# Patient Record
Sex: MALE | Race: WHITE | URBAN - METROPOLITAN AREA
[De-identification: names, ages, dates, MRNs, and addresses within clinical notes are randomized per-mention and may not be internally consistent; named-entity substitution may affect disease eponyms.]

---

## 2017-08-08 ENCOUNTER — APPOINTMENT (RX ONLY)
Dept: URBAN - METROPOLITAN AREA OTHER 12 | Facility: OTHER | Age: 27
Setting detail: DERMATOLOGY
End: 2017-08-08

## 2017-08-08 DIAGNOSIS — Z79.899 OTHER LONG TERM (CURRENT) DRUG THERAPY: ICD-10-CM

## 2017-08-08 DIAGNOSIS — L70.0 ACNE VULGARIS: ICD-10-CM

## 2017-08-08 PROCEDURE — ? ORDER TESTS

## 2017-08-08 PROCEDURE — 99213 OFFICE O/P EST LOW 20 MIN: CPT

## 2017-08-08 PROCEDURE — ? ISOTRETINOIN INITIATION

## 2017-08-08 PROCEDURE — ? COUNSELING

## 2017-08-08 PROCEDURE — ? PRESCRIPTION

## 2017-08-08 RX ORDER — ISOTRETINOIN 40 MG/1
CAPSULE, LIQUID FILLED ORAL
Qty: 30 | Refills: 0

## 2017-08-08 ASSESSMENT — LOCATION ZONE DERM
LOCATION ZONE: FACE
LOCATION ZONE: TRUNK

## 2017-08-08 ASSESSMENT — LOCATION SIMPLE DESCRIPTION DERM
LOCATION SIMPLE: CHEST
LOCATION SIMPLE: RIGHT CHEEK
LOCATION SIMPLE: LEFT UPPER BACK

## 2017-08-08 ASSESSMENT — LOCATION DETAILED DESCRIPTION DERM
LOCATION DETAILED: RIGHT CENTRAL MALAR CHEEK
LOCATION DETAILED: LEFT SUPERIOR UPPER BACK
LOCATION DETAILED: RIGHT MEDIAL SUPERIOR CHEST

## 2017-08-08 NOTE — HPI: PIMPLES (ACNE)
How Severe Is Your Acne?: severe
Is This A New Presentation, Or A Follow-Up?: Follow Up Acne
Additional Comments (Use Complete Sentences): Patient reports he has current acne cystic flares off and on to his face and back.  He has noticed new scarring.  He has been using OTC BP cream as a spot treatment for the last 4 months.  Patient is here to start Accutane.

## 2017-08-08 NOTE — PROCEDURE: ISOTRETINOIN INITIATION
Initial Cholesterol (Optional): Standing Lab order: Liver / Lipid panel : Pending results
Ipledge Number (Optional): [id number]
Detail Level: Zone
Patient Reported Weight (Optional - Include Units): 165

## 2017-09-11 ENCOUNTER — APPOINTMENT (RX ONLY)
Dept: URBAN - METROPOLITAN AREA OTHER 12 | Facility: OTHER | Age: 27
Setting detail: DERMATOLOGY
End: 2017-09-11

## 2017-09-11 DIAGNOSIS — L70.0 ACNE VULGARIS: ICD-10-CM

## 2017-09-11 DIAGNOSIS — Z79.899 OTHER LONG TERM (CURRENT) DRUG THERAPY: ICD-10-CM

## 2017-09-11 PROCEDURE — ? PRESCRIPTION

## 2017-09-11 PROCEDURE — ? COUNSELING

## 2017-09-11 PROCEDURE — 99213 OFFICE O/P EST LOW 20 MIN: CPT

## 2017-09-11 PROCEDURE — ? ORDER TESTS

## 2017-09-11 PROCEDURE — ? ISOTRETINOIN MONITORING

## 2017-09-11 RX ORDER — ISOTRETINOIN 40 MG/1
CAPSULE ORAL
Qty: 60 | Refills: 0 | COMMUNITY
Start: 2017-09-11

## 2017-09-11 RX ADMIN — ISOTRETINOIN: 40 CAPSULE ORAL at 16:59

## 2017-09-11 ASSESSMENT — LOCATION DETAILED DESCRIPTION DERM
LOCATION DETAILED: LEFT CENTRAL MALAR CHEEK
LOCATION DETAILED: RIGHT MEDIAL SUPERIOR CHEST
LOCATION DETAILED: LEFT SUPERIOR UPPER BACK

## 2017-09-11 ASSESSMENT — LOCATION ZONE DERM
LOCATION ZONE: TRUNK
LOCATION ZONE: FACE

## 2017-09-11 ASSESSMENT — LOCATION SIMPLE DESCRIPTION DERM
LOCATION SIMPLE: LEFT CHEEK
LOCATION SIMPLE: CHEST
LOCATION SIMPLE: LEFT UPPER BACK

## 2017-09-11 NOTE — PROCEDURE: ISOTRETINOIN MONITORING
Dosing Month 1 (Required For Cumulative Dosing): 40mg Daily
Pounds Preamble Statement (Weight Entered In Details Tab): Reported Weight in pounds:
Display Individual Monthly Dosage In The Note (If Yes Will Display All Dosages Which Are Not N/A): yes
Female Completion Statement: After discussing her treatment course we decided to discontinue isotretinoin therapy at this time. I explained that she would need to continue her birth control methods for at least one month after the last dosage. She should also get a pregnancy test one month after the last dose. She shouldn't donate blood for one month after the last dose. She should call with any new symptoms of depression.
Male Completion Statement: After discussing his treatment course we decided to discontinue isotretinoin therapy at this time. He shouldn't donate blood for one month after the last dose. He should call with any new symptoms of depression.
Months Of Therapy Completed: 1
Kilograms Preamble Statement (Weight Entered In Details Tab): Reported Weight in kilograms:
Patient Weight (Optional But Required For Cumulative Dose-Numbers And Decimals Only): 165
Detail Level: Zone
Weight Units: pounds
Completed Therapy?: No
Ipledge Number (Optional): [id number]

## 2017-10-09 ENCOUNTER — APPOINTMENT (RX ONLY)
Dept: URBAN - METROPOLITAN AREA OTHER 12 | Facility: OTHER | Age: 27
Setting detail: DERMATOLOGY
End: 2017-10-09

## 2017-10-09 DIAGNOSIS — L70.0 ACNE VULGARIS: ICD-10-CM

## 2017-10-09 DIAGNOSIS — Z79.899 OTHER LONG TERM (CURRENT) DRUG THERAPY: ICD-10-CM

## 2017-10-09 PROCEDURE — ? ISOTRETINOIN MONITORING

## 2017-10-09 PROCEDURE — ? COUNSELING

## 2017-10-09 PROCEDURE — ? PRESCRIPTION

## 2017-10-09 PROCEDURE — ? TREATMENT REGIMEN

## 2017-10-09 PROCEDURE — 99213 OFFICE O/P EST LOW 20 MIN: CPT

## 2017-10-09 RX ORDER — ISOTRETINOIN 40 MG/1
CAPSULE ORAL
Qty: 60 | Refills: 0

## 2017-10-09 ASSESSMENT — LOCATION DETAILED DESCRIPTION DERM
LOCATION DETAILED: LEFT SUPERIOR MEDIAL UPPER BACK
LOCATION DETAILED: LEFT CENTRAL MALAR CHEEK

## 2017-10-09 ASSESSMENT — LOCATION ZONE DERM
LOCATION ZONE: TRUNK
LOCATION ZONE: FACE

## 2017-10-09 ASSESSMENT — LOCATION SIMPLE DESCRIPTION DERM
LOCATION SIMPLE: LEFT UPPER BACK
LOCATION SIMPLE: LEFT CHEEK

## 2017-10-09 NOTE — PROCEDURE: ISOTRETINOIN MONITORING
Kilograms Preamble Statement (Weight Entered In Details Tab): Reported Weight in kilograms:
Display Individual Monthly Dosage In The Note (If Yes Will Display All Dosages Which Are Not N/A): yes
Male Completion Statement: After discussing his treatment course we decided to discontinue isotretinoin therapy at this time. He shouldn't donate blood for one month after the last dose. He should call with any new symptoms of depression.
Dosing Month 1 (Required For Cumulative Dosing): 40mg Daily
Completed Therapy?: No
Dosing Month 2 (Required For Cumulative Dosing): 80mg Daily
Female Completion Statement: After discussing her treatment course we decided to discontinue isotretinoin therapy at this time. I explained that she would need to continue her birth control methods for at least one month after the last dosage. She should also get a pregnancy test one month after the last dose. She shouldn't donate blood for one month after the last dose. She should call with any new symptoms of depression.
Months Of Therapy Completed: 2
Detail Level: Zone
Patient Weight (Optional But Required For Cumulative Dose-Numbers And Decimals Only): 165
Weight Units: pounds
Pounds Preamble Statement (Weight Entered In Details Tab): Reported Weight in pounds:

## 2017-11-08 ENCOUNTER — APPOINTMENT (RX ONLY)
Dept: URBAN - METROPOLITAN AREA OTHER 12 | Facility: OTHER | Age: 27
Setting detail: DERMATOLOGY
End: 2017-11-08

## 2017-11-08 DIAGNOSIS — Z79.899 OTHER LONG TERM (CURRENT) DRUG THERAPY: ICD-10-CM

## 2017-11-08 DIAGNOSIS — L70.0 ACNE VULGARIS: ICD-10-CM | Status: IMPROVED

## 2017-11-08 PROCEDURE — ? ISOTRETINOIN MONITORING

## 2017-11-08 PROCEDURE — ? COUNSELING

## 2017-11-08 PROCEDURE — 99213 OFFICE O/P EST LOW 20 MIN: CPT

## 2017-11-08 PROCEDURE — ? PRESCRIPTION

## 2017-11-08 RX ORDER — ISOTRETINOIN 40 MG/1
CAPSULE ORAL
Qty: 90 | Refills: 0

## 2017-11-08 ASSESSMENT — LOCATION SIMPLE DESCRIPTION DERM: LOCATION SIMPLE: LEFT CHEEK

## 2017-11-08 ASSESSMENT — LOCATION DETAILED DESCRIPTION DERM: LOCATION DETAILED: LEFT CENTRAL MALAR CHEEK

## 2017-11-08 ASSESSMENT — LOCATION ZONE DERM: LOCATION ZONE: FACE

## 2017-11-08 NOTE — HPI: MEDICATION (ISOTRETINOIN)
How Severe Is It?: moderate
Is This A New Presentation, Or A Follow-Up?: Follow Up Isotretinoin
Display Cumulative Dose In The Note?: Yes
Patient Reported Weight In Pounds (Required For Calculation): 175

## 2017-11-08 NOTE — PROCEDURE: ISOTRETINOIN MONITORING
Detail Level: Zone
Patient Weight (Optional But Required For Cumulative Dose-Numbers And Decimals Only): 175
Pounds Preamble Statement (Weight Entered In Details Tab): Reported Weight in pounds:
Completed Therapy?: No
Ipledge Number (Optional): [id number]
Female Completion Statement: After discussing her treatment course we decided to discontinue isotretinoin therapy at this time. I explained that she would need to continue her birth control methods for at least one month after the last dosage. She should also get a pregnancy test one month after the last dose. She shouldn't donate blood for one month after the last dose. She should call with any new symptoms of depression.
Weight Units: pounds
Dosing Month 1 (Required For Cumulative Dosing): 40mg Daily
Months Of Therapy Completed: 4
Kilograms Preamble Statement (Weight Entered In Details Tab): Reported Weight in kilograms:
Male Completion Statement: After discussing his treatment course we decided to discontinue isotretinoin therapy at this time. He shouldn't donate blood for one month after the last dose. He should call with any new symptoms of depression.
Dosing Month 2 (Required For Cumulative Dosing): 40mg BID
Most Recent Triglycerides (Optional): 55
Are Labs Available For Review?: Yes
Most Recent Cholesterol (Optional): 191

## 2017-12-07 ENCOUNTER — APPOINTMENT (RX ONLY)
Dept: URBAN - METROPOLITAN AREA OTHER 12 | Facility: OTHER | Age: 27
Setting detail: DERMATOLOGY
End: 2017-12-07

## 2017-12-07 DIAGNOSIS — L70.0 ACNE VULGARIS: ICD-10-CM

## 2017-12-07 DIAGNOSIS — Z79.899 OTHER LONG TERM (CURRENT) DRUG THERAPY: ICD-10-CM

## 2017-12-07 PROCEDURE — 99213 OFFICE O/P EST LOW 20 MIN: CPT

## 2017-12-07 PROCEDURE — ? ISOTRETINOIN MONITORING

## 2017-12-07 PROCEDURE — ? PRESCRIPTION

## 2017-12-07 PROCEDURE — ? COUNSELING

## 2017-12-07 PROCEDURE — ? TREATMENT REGIMEN

## 2017-12-07 RX ORDER — ISOTRETINOIN 40 MG/1
CAPSULE ORAL
Qty: 90 | Refills: 0

## 2017-12-07 ASSESSMENT — LOCATION DETAILED DESCRIPTION DERM
LOCATION DETAILED: LEFT CENTRAL MALAR CHEEK
LOCATION DETAILED: RIGHT SUPERIOR UPPER BACK

## 2017-12-07 ASSESSMENT — LOCATION SIMPLE DESCRIPTION DERM
LOCATION SIMPLE: RIGHT UPPER BACK
LOCATION SIMPLE: LEFT CHEEK

## 2017-12-07 ASSESSMENT — LOCATION ZONE DERM
LOCATION ZONE: TRUNK
LOCATION ZONE: FACE

## 2017-12-07 NOTE — HPI: MEDICATION (ISOTRETINOIN)
How Severe Is It?: moderate
Is This A New Presentation, Or A Follow-Up?: Follow Up Acne
Additional History: The patient stated he had a couple of breakouts during the month of therapy.

## 2017-12-07 NOTE — PROCEDURE: ISOTRETINOIN MONITORING
Dosing Month 4 (Required For Cumulative Dosing): 120mg Daily
Weight Units: pounds
Are Labs Available For Review?: No- Not Drawn Yet
Completed Therapy?: No
Female Completion Statement: After discussing her treatment course we decided to discontinue isotretinoin therapy at this time. I explained that she would need to continue her birth control methods for at least one month after the last dosage. She should also get a pregnancy test one month after the last dose. She shouldn't donate blood for one month after the last dose. She should call with any new symptoms of depression.
Male Completion Statement: After discussing his treatment course we decided to discontinue isotretinoin therapy at this time. He shouldn't donate blood for one month after the last dose. He should call with any new symptoms of depression.
Dosing Month 3 (Required For Cumulative Dosing): 80mg Daily
Months Of Therapy Completed: 4
Patient Weight (Optional But Required For Cumulative Dose-Numbers And Decimals Only): 175
Pounds Preamble Statement (Weight Entered In Details Tab): Reported Weight in pounds:
Kilograms Preamble Statement (Weight Entered In Details Tab): Reported Weight in kilograms:
Detail Level: Zone
Display Individual Monthly Dosage In The Note (If Yes Will Display All Dosages Which Are Not N/A): yes
Dosing Month 1 (Required For Cumulative Dosing): 40mg Daily

## 2018-01-09 ENCOUNTER — APPOINTMENT (RX ONLY)
Dept: URBAN - METROPOLITAN AREA OTHER 12 | Facility: OTHER | Age: 28
Setting detail: DERMATOLOGY
End: 2018-01-09

## 2018-01-09 DIAGNOSIS — L70.0 ACNE VULGARIS: ICD-10-CM

## 2018-01-09 DIAGNOSIS — Z79.899 OTHER LONG TERM (CURRENT) DRUG THERAPY: ICD-10-CM

## 2018-01-09 PROCEDURE — 99213 OFFICE O/P EST LOW 20 MIN: CPT

## 2018-01-09 PROCEDURE — ? COUNSELING

## 2018-01-09 PROCEDURE — ? ISOTRETINOIN MONITORING

## 2018-01-09 PROCEDURE — ? PRESCRIPTION

## 2018-01-09 RX ORDER — ISOTRETINOIN 40 MG/1
CAPSULE ORAL
Qty: 90 | Refills: 0

## 2018-01-09 ASSESSMENT — LOCATION SIMPLE DESCRIPTION DERM
LOCATION SIMPLE: LEFT CHEEK
LOCATION SIMPLE: CHEST
LOCATION SIMPLE: RIGHT UPPER BACK

## 2018-01-09 ASSESSMENT — LOCATION DETAILED DESCRIPTION DERM
LOCATION DETAILED: LEFT INFERIOR CENTRAL MALAR CHEEK
LOCATION DETAILED: RIGHT MID-UPPER BACK
LOCATION DETAILED: LEFT MEDIAL SUPERIOR CHEST

## 2018-01-09 ASSESSMENT — LOCATION ZONE DERM
LOCATION ZONE: FACE
LOCATION ZONE: TRUNK

## 2018-01-09 NOTE — HPI: MEDICATION (ISOTRETINOIN)
How Severe Is It?: mild
Is This A New Presentation, Or A Follow-Up?: Follow Up Acne
Additional History: The patient stated he had some dryness around his genitalia. He has been using Vaseline on the area. He also experienced a shingles breakout 2 weeks ago. [facility] Acyclovir 800 mg five times a day for a week.

## 2018-01-09 NOTE — PROCEDURE: ISOTRETINOIN MONITORING
Display Individual Monthly Dosage In The Note (If Yes Will Display All Dosages Which Are Not N/A): yes
Female Completion Statement: After discussing her treatment course we decided to discontinue isotretinoin therapy at this time. I explained that she would need to continue her birth control methods for at least one month after the last dosage. She should also get a pregnancy test one month after the last dose. She shouldn't donate blood for one month after the last dose. She should call with any new symptoms of depression.
Are Labs Available For Review?: No- Not Drawn Yet
Kilograms Preamble Statement (Weight Entered In Details Tab): Reported Weight in kilograms:
Patient Weight (Optional But Required For Cumulative Dose-Numbers And Decimals Only): 175
Male Completion Statement: After discussing his treatment course we decided to discontinue isotretinoin therapy at this time. He shouldn't donate blood for one month after the last dose. He should call with any new symptoms of depression.
Dosing Month 4 (Required For Cumulative Dosing): 120mg Daily
Ipledge Number (Optional): [id number]
Months Of Therapy Completed: 5
Pounds Preamble Statement (Weight Entered In Details Tab): Reported Weight in pounds:
Completed Therapy?: No
Weight Units: pounds
Dosing Month 2 (Required For Cumulative Dosing): 80mg Daily
Detail Level: Zone
Dosing Month 1 (Required For Cumulative Dosing): 40mg Daily

## 2025-07-28 ENCOUNTER — APPOINTMENT (OUTPATIENT)
Dept: RADIOLOGY | Facility: CLINIC | Age: 35
End: 2025-07-28
Attending: EMERGENCY MEDICINE
Payer: COMMERCIAL

## 2025-07-28 ENCOUNTER — HOSPITAL ENCOUNTER (EMERGENCY)
Facility: CLINIC | Age: 35
Discharge: HOME | End: 2025-07-28
Attending: EMERGENCY MEDICINE | Admitting: EMERGENCY MEDICINE
Payer: COMMERCIAL

## 2025-07-28 VITALS
BODY MASS INDEX: 28.73 KG/M2 | OXYGEN SATURATION: 97 % | HEART RATE: 70 BPM | RESPIRATION RATE: 21 BRPM | HEIGHT: 69 IN | WEIGHT: 194 LBS | SYSTOLIC BLOOD PRESSURE: 137 MMHG | TEMPERATURE: 98.6 F | DIASTOLIC BLOOD PRESSURE: 74 MMHG

## 2025-07-28 DIAGNOSIS — R07.89 ATYPICAL CHEST PAIN: ICD-10-CM

## 2025-07-28 DIAGNOSIS — R06.02 SHORTNESS OF BREATH: Primary | ICD-10-CM

## 2025-07-28 LAB
ANION GAP SERPL CALCULATED.3IONS-SCNC: 11 MMOL/L (ref 7–15)
BASOPHILS # BLD AUTO: 0 10E3/UL (ref 0–0.2)
BASOPHILS NFR BLD AUTO: 1 %
BUN SERPL-MCNC: 14.7 MG/DL (ref 6–20)
CALCIUM SERPL-MCNC: 9.8 MG/DL (ref 8.8–10.4)
CHLORIDE SERPL-SCNC: 102 MMOL/L (ref 98–107)
CREAT SERPL-MCNC: 1.31 MG/DL (ref 0.67–1.17)
EGFRCR SERPLBLD CKD-EPI 2021: 73 ML/MIN/1.73M2
EOSINOPHIL # BLD AUTO: 0.1 10E3/UL (ref 0–0.7)
EOSINOPHIL NFR BLD AUTO: 2 %
ERYTHROCYTE [DISTWIDTH] IN BLOOD BY AUTOMATED COUNT: 13.6 % (ref 10–15)
GLUCOSE SERPL-MCNC: 115 MG/DL (ref 70–99)
HCO3 SERPL-SCNC: 24 MMOL/L (ref 22–29)
HCT VFR BLD AUTO: 41.1 % (ref 40–53)
HGB BLD-MCNC: 14.2 G/DL (ref 13.3–17.7)
IMM GRANULOCYTES # BLD: 0.1 10E3/UL
IMM GRANULOCYTES NFR BLD: 1 %
LYMPHOCYTES # BLD AUTO: 2 10E3/UL (ref 0.8–5.3)
LYMPHOCYTES NFR BLD AUTO: 35 %
MCH RBC QN AUTO: 32.1 PG (ref 26.5–33)
MCHC RBC AUTO-ENTMCNC: 34.5 G/DL (ref 31.5–36.5)
MCV RBC AUTO: 93 FL (ref 78–100)
MONOCYTES # BLD AUTO: 0.5 10E3/UL (ref 0–1.3)
MONOCYTES NFR BLD AUTO: 8 %
NEUTROPHILS # BLD AUTO: 3.2 10E3/UL (ref 1.6–8.3)
NEUTROPHILS NFR BLD AUTO: 55 %
NRBC # BLD AUTO: 0 10E3/UL
NRBC BLD AUTO-RTO: 0 /100
PLATELET # BLD AUTO: 218 10E3/UL (ref 150–450)
POTASSIUM SERPL-SCNC: 4.1 MMOL/L (ref 3.4–5.3)
RBC # BLD AUTO: 4.42 10E6/UL (ref 4.4–5.9)
SODIUM SERPL-SCNC: 137 MMOL/L (ref 135–145)
TROPONIN T SERPL HS-MCNC: <6 NG/L
WBC # BLD AUTO: 5.9 10E3/UL (ref 4–11)

## 2025-07-28 PROCEDURE — 36415 COLL VENOUS BLD VENIPUNCTURE: CPT | Performed by: EMERGENCY MEDICINE

## 2025-07-28 PROCEDURE — 82374 ASSAY BLOOD CARBON DIOXIDE: CPT | Performed by: EMERGENCY MEDICINE

## 2025-07-28 PROCEDURE — 84484 ASSAY OF TROPONIN QUANT: CPT | Performed by: EMERGENCY MEDICINE

## 2025-07-28 PROCEDURE — 70360 X-RAY EXAM OF NECK: CPT

## 2025-07-28 PROCEDURE — 250N000013 HC RX MED GY IP 250 OP 250 PS 637: Performed by: EMERGENCY MEDICINE

## 2025-07-28 PROCEDURE — 71046 X-RAY EXAM CHEST 2 VIEWS: CPT

## 2025-07-28 PROCEDURE — 250N000011 HC RX IP 250 OP 636: Performed by: EMERGENCY MEDICINE

## 2025-07-28 PROCEDURE — 93005 ELECTROCARDIOGRAM TRACING: CPT | Performed by: EMERGENCY MEDICINE

## 2025-07-28 PROCEDURE — 99285 EMERGENCY DEPT VISIT HI MDM: CPT | Mod: 25 | Performed by: EMERGENCY MEDICINE

## 2025-07-28 PROCEDURE — 85014 HEMATOCRIT: CPT | Performed by: EMERGENCY MEDICINE

## 2025-07-28 RX ORDER — ASPIRIN 81 MG/1
324 TABLET, CHEWABLE ORAL ONCE
Status: COMPLETED | OUTPATIENT
Start: 2025-07-28 | End: 2025-07-28

## 2025-07-28 RX ADMIN — ASPIRIN 81 MG CHEWABLE TABLET 324 MG: 81 TABLET CHEWABLE at 20:31

## 2025-07-28 RX ADMIN — FAMOTIDINE 20 MG: 10 INJECTION INTRAVENOUS at 20:31

## 2025-07-28 ASSESSMENT — ACTIVITIES OF DAILY LIVING (ADL)
ADLS_ACUITY_SCORE: 41
ADLS_ACUITY_SCORE: 41

## 2025-07-29 LAB
ATRIAL RATE - MUSE: 92 BPM
DIASTOLIC BLOOD PRESSURE - MUSE: 79 MMHG
INTERPRETATION ECG - MUSE: NORMAL
P AXIS - MUSE: 73 DEGREES
PR INTERVAL - MUSE: 154 MS
QRS DURATION - MUSE: 90 MS
QT - MUSE: 378 MS
QTC - MUSE: 467 MS
R AXIS - MUSE: 48 DEGREES
SYSTOLIC BLOOD PRESSURE - MUSE: 174 MMHG
T AXIS - MUSE: 35 DEGREES
VENTRICULAR RATE- MUSE: 92 BPM

## 2025-07-29 NOTE — ED PROVIDER NOTES
EMERGENCY DEPARTMENT ENCOUNTER      NAME: Ulysses C Cortez  AGE: 34 year old male  YOB: 1990  MRN: [id number]  EVALUATION DATE & TIME: 7/28/2025  7:47 PM    PCP: System, Provider Not In    ED PROVIDER: Daysi Trammell MD      Chief Complaint   Patient presents with    Chest Pain     Sent from [facility] .  Sx since Saturday         FINAL IMPRESSION:  1. Shortness of breath    2. Atypical chest pain          ED COURSE & MEDICAL DECISION MAKING:    Pertinent Labs & Imaging studies reviewed. (See chart for details)  34 year old male presents to the Emergency Department for evaluation of shortness of breath and sensation of something in his throat when he lays down.  He feels improved with sitting up.  Patient had been seen in urgent care and was sent to the emergency department for further evaluation.  Initial troponin is negative.  Soft tissue neck x-ray and chest x-ray are unremarkable.  Patient with no pleuritic pain, no sharp pain in the chest, no concern for PE.  Patient is otherwise very active with no exertional chest pain.  It is only worse with laying down.  He does report he had a heavy meal on Saturday evening.  He improved with IV Pepcid.  I suspect that this is GERD related symptoms, discussed with him initiation of a PPI.  He states he would initially like to try diet changes prior to starting a PPI and follow-up with primary care provider.  He recently moved into the area and has an assigned primary care provider through the  and will try to follow-up with the assigned provider.  I did put in a referral for primary care in the event that it is difficult for him to follow-up with his assigned provider.  I also put in for follow-up with cardiology and rapid access cardiology clinic for atypical chest pain.    8:00 PM I met with the patient, obtained history, performed an initial exam, and discussed options and plan for diagnostics and treatment here in the ED.   9:30 PM At the  conclusion of the encounter I discussed the results of all of the tests and the disposition. The questions were answered. The patient or family acknowledged understanding and was agreeable with the care plan.       Medical Decision Making      Discharge. I recommended prescription strength medication(s) but the patient, but patient declined and would like to start dietary changes and see if that helps which is certainly reasonable. See documentation for any additional details.    MIPS:  Not Applicable    SEPSIS: None          MIPS: Not Applicable        MEDICATIONS GIVEN IN THE EMERGENCY:  Medications   aspirin (ASA) chewable tablet 324 mg (324 mg Oral $Given 7/28/25 2031)   famotidine (PEPCID) injection 20 mg (20 mg Intravenous $Given 7/28/25 2031)       NEW PRESCRIPTIONS STARTED AT TODAY'S ER VISIT  New Prescriptions    No medications on file          =================================================================    HPI    Patient information was obtained from: Patient     Use of : N/A         Ulysseskia Santoro is a 34 year old male with a pertinent history of long term use antibiotic who presents to this ED walking for evaluation of chest pain.    Patient was sent to the ED from Urgent care for upper chest and throat discomfort since Saturday night. He reports having 4-5 drinks and nicotine that night but says he only does that every other weekend and that it is not unusual. Symptoms seemed to go away on Sunday but at night he felt like he was unable to breath unless he sat upright. Today at work he had a similar episode with chest pain, lightheadedness and shortness of breath. He has discomfort in his throat and is burping more but denies any problem swallowing. He is unsure if he swallowed something wrong in the past few days. He denies any abdominal pain with this. Patient works out frequently and has never had this happen before. He denies nausea, vomiting, fever, chills, cough, or congestion.  "Patient has no cardiac history and does not smoke.    Per Chart Review: [facility] visit for throat and chest tightness. Shortness of breath with deep breaths and lightheadedness.  XR Chest and EKG were negative. Recommend further evaluation at ED.      PAST MEDICAL HISTORY:  No past medical history on file.    PAST SURGICAL HISTORY:  No past surgical history on file.        CURRENT MEDICATIONS:    No current outpatient medications on file.      ALLERGIES:  No Known Allergies    FAMILY HISTORY:  No family history on file.    SOCIAL HISTORY:   Social History     Socioeconomic History    Marital status: Single       VITALS:  /74   Pulse 70   Temp 98.6  F (37  C) (Oral)   Resp 21   Ht 1.753 m (5' 9\")   Wt 88 kg (194 lb)   SpO2 97%   BMI 28.65 kg/m      PHYSICAL EXAM    Constitutional: Well developed, Well nourished, NAD  HENT: Normocephalic, Atraumatic, Bilateral external ears normal, Oropharynx normal, mucous membranes moist, Nose normal.   Neck- Normal range of motion, No tenderness, Supple, No stridor.  Eyes: PERRL, EOMI, Conjunctiva normal, No discharge.   Respiratory: Normal breath sounds, No respiratory distress  Cardiovascular: Normal heart rate, Regular rhythm  GI: Bowel sounds normal, Soft, No tenderness,   Musculoskeletal: No edema. Good range of motion in all major joints. No tenderness to palpation or major deformities noted.   Integument: Warm, Dry, No erythema, No rash  Neurologic: Alert & oriented x 3, Normal motor function, Normal sensory function, No focal deficits noted. Normal gait.   Psychiatric: Affect normal, Judgment normal, Mood normal.      LAB:  All pertinent labs reviewed and interpreted.  Results for orders placed or performed during the hospital encounter of 07/28/25   XR Chest 2 Views    Impression    IMPRESSION: Negative chest.   Neck soft tissue XR    Impression    IMPRESSION: The visualized portions of the nasopharynx, oropharynx, hypopharynx, larynx, and " upper trachea are well-aerated. No prevertebral soft tissue thickening. Normal appearance of the epiglottis.   Basic metabolic panel   Result Value Ref Range    Sodium 137 135 - 145 mmol/L    Potassium 4.1 3.4 - 5.3 mmol/L    Chloride 102 98 - 107 mmol/L    Carbon Dioxide (CO2) 24 22 - 29 mmol/L    Anion Gap 11 7 - 15 mmol/L    Urea Nitrogen 14.7 6.0 - 20.0 mg/dL    Creatinine 1.31 (H) 0.67 - 1.17 mg/dL    GFR Estimate 73 >60 mL/min/1.73m2    Calcium 9.8 8.8 - 10.4 mg/dL    Glucose 115 (H) 70 - 99 mg/dL   Result Value Ref Range    Troponin T, High Sensitivity <6 <=22 ng/L   CBC with platelets and differential   Result Value Ref Range    WBC Count 5.9 4.0 - 11.0 10e3/uL    RBC Count 4.42 4.40 - 5.90 10e6/uL    Hemoglobin 14.2 13.3 - 17.7 g/dL    Hematocrit 41.1 40.0 - 53.0 %    MCV 93 78 - 100 fL    MCH 32.1 26.5 - 33.0 pg    MCHC 34.5 31.5 - 36.5 g/dL    RDW 13.6 10.0 - 15.0 %    Platelet Count 218 150 - 450 10e3/uL    % Neutrophils 55 %    % Lymphocytes 35 %    % Monocytes 8 %    % Eosinophils 2 %    % Basophils 1 %    % Immature Granulocytes 1 %    NRBCs per 100 WBC 0 <1 /100    Absolute Neutrophils 3.2 1.6 - 8.3 10e3/uL    Absolute Lymphocytes 2.0 0.8 - 5.3 10e3/uL    Absolute Monocytes 0.5 0.0 - 1.3 10e3/uL    Absolute Eosinophils 0.1 0.0 - 0.7 10e3/uL    Absolute Basophils 0.0 0.0 - 0.2 10e3/uL    Absolute Immature Granulocytes 0.1 <=0.4 10e3/uL    Absolute NRBCs 0.0 10e3/uL       RADIOLOGY:  Reviewed all pertinent imaging. Please see official radiology report.  Neck soft tissue XR   Final Result   IMPRESSION: The visualized portions of the nasopharynx, oropharynx, hypopharynx, larynx, and upper trachea are well-aerated. No prevertebral soft tissue thickening. Normal appearance of the epiglottis.      XR Chest 2 Views   Final Result   IMPRESSION: Negative chest.          EKG:    Performed at: 19:53    Impression: Sinus Rhythm with sinus arrhythmia    Rate: 92  Rhythm: Sinus     AK Interval: 154  QRS Interval:  90  QTc Interval: 378  ST Changes: No acute ischemia  Comparison: No previous ECG.    I have independently reviewed and interpreted the EKG(s) documented above.        I, Christal Cortes, am serving as a scribe to document services personally performed by Daysi Trammell MD, based on my observation and the provider's statements to me. I, Daysi Trammell MD, attest that Christal Cortes is acting in a scribe capacity, has observed my performance of the services and has documented them in accordance with my direction.    Daysi Trammell MD  Emergency Medicine  [facility] EMERGENCY ROOM  [address], [place] [id number] [phone]      Daysi Trammell MD  07/28/25 2741

## 2025-07-29 NOTE — ED TRIAGE NOTES
Sent from  for further work up of chest pain he has had since Saturday.  Had lightheaded at work and went to .  Negative EKG and CXR  Wanted to get further work up     Triage Assessment (Adult)       Row Name 07/28/25 1953          Triage Assessment    Airway WDL WDL        Respiratory WDL    Respiratory WDL WDL        Skin Circulation/Temperature WDL    Skin Circulation/Temperature WDL WDL        Cardiac WDL    Cardiac WDL X;chest pain        Chest Pain Assessment    Chest Pain Location midsternal     Character burning     Precipitating Factors emotional stress     Associated Signs/Symptoms anxiety        Peripheral/Neurovascular WDL    Peripheral Neurovascular WDL WDL        Cognitive/Neuro/Behavioral WDL    Cognitive/Neuro/Behavioral WDL WDL         Cardiac Risk Factors:    Takes Asprin daily NO  Smoking History NO  USES ZINS, 3 MG NICOTINE POUCHES THIS PAST SATURDAY AND YESTERDAY  Diabetic  NO  High Cholesterol NO    Family History of Coronary Heart Diease NO  Hypertension NO    ETOH use OCCASIONAL, LAST DRANK SATURDAY  Street drug use NO  Use of any erectile dysfunction medications in the last 24-48 hours NO       Months Of Therapy Completed: 4